# Patient Record
Sex: FEMALE | Race: WHITE | NOT HISPANIC OR LATINO | URBAN - METROPOLITAN AREA
[De-identification: names, ages, dates, MRNs, and addresses within clinical notes are randomized per-mention and may not be internally consistent; named-entity substitution may affect disease eponyms.]

---

## 2022-04-08 RX ORDER — ROSUVASTATIN CALCIUM 40 MG/1
40 TABLET, COATED ORAL NIGHTLY
COMMUNITY
Start: 2022-01-25

## 2022-04-08 RX ORDER — TRAMADOL HYDROCHLORIDE 50 MG/1
50 TABLET ORAL EVERY 4 HOURS PRN
COMMUNITY
Start: 2022-02-01

## 2022-04-08 RX ORDER — LEVOTHYROXINE SODIUM 150 UG/1
150 TABLET ORAL DAILY
COMMUNITY
Start: 2022-01-14

## 2022-04-08 RX ORDER — PANTOPRAZOLE SODIUM 40 MG/1
40 TABLET, DELAYED RELEASE ORAL DAILY
COMMUNITY
Start: 2021-12-30

## 2022-04-08 RX ORDER — RANOLAZINE 500 MG/1
500 TABLET, EXTENDED RELEASE ORAL 2 TIMES DAILY
COMMUNITY
Start: 2021-12-16

## 2022-04-08 RX ORDER — DIAZEPAM 10 MG/1
TABLET ORAL
COMMUNITY
Start: 2022-01-24

## 2022-04-08 RX ORDER — TIZANIDINE 4 MG/1
4 TABLET ORAL 3 TIMES DAILY
COMMUNITY
Start: 2022-02-01

## 2022-04-08 RX ORDER — SITAGLIPTIN AND METFORMIN HYDROCHLORIDE 1000; 50 MG/1; MG/1
1 TABLET, FILM COATED ORAL 2 TIMES DAILY
COMMUNITY
Start: 2022-01-24 | End: 2022-04-22

## 2022-04-08 RX ORDER — FUROSEMIDE 40 MG/1
40 TABLET ORAL DAILY
COMMUNITY
Start: 2022-02-08

## 2022-04-08 RX ORDER — DULAGLUTIDE 0.75 MG/.5ML
INJECTION, SOLUTION SUBCUTANEOUS
COMMUNITY
Start: 2022-02-01

## 2022-04-08 RX ORDER — ZOLPIDEM TARTRATE 10 MG/1
10 TABLET ORAL NIGHTLY
COMMUNITY
Start: 2022-01-25

## 2022-04-08 RX ORDER — LOSARTAN POTASSIUM 100 MG/1
100 TABLET ORAL DAILY
COMMUNITY
Start: 2021-10-18

## 2022-04-08 RX ORDER — CLOPIDOGREL BISULFATE 75 MG/1
75 TABLET ORAL DAILY
COMMUNITY
Start: 2021-12-21

## 2022-04-08 RX ORDER — AMITRIPTYLINE HYDROCHLORIDE 100 MG/1
100 TABLET ORAL NIGHTLY
COMMUNITY
Start: 2022-01-14

## 2022-04-08 RX ORDER — METOPROLOL TARTRATE 25 MG/1
25 TABLET, FILM COATED ORAL 2 TIMES DAILY
COMMUNITY
Start: 2021-10-27

## 2022-04-08 RX ORDER — TOPIRAMATE 100 MG/1
100 TABLET, FILM COATED ORAL NIGHTLY
COMMUNITY
Start: 2022-01-24

## 2022-04-11 ENCOUNTER — OFFICE VISIT (OUTPATIENT)
Dept: GASTROENTEROLOGY | Facility: CLINIC | Age: 52
End: 2022-04-11
Payer: COMMERCIAL

## 2022-04-11 VITALS
OXYGEN SATURATION: 90 % | SYSTOLIC BLOOD PRESSURE: 132 MMHG | HEIGHT: 67 IN | DIASTOLIC BLOOD PRESSURE: 78 MMHG | HEART RATE: 72 BPM | WEIGHT: 242 LBS | BODY MASS INDEX: 37.98 KG/M2

## 2022-04-11 DIAGNOSIS — R10.31 RIGHT LOWER QUADRANT ABDOMINAL PAIN: ICD-10-CM

## 2022-04-11 DIAGNOSIS — Z12.11 SCREENING FOR COLON CANCER: ICD-10-CM

## 2022-04-11 DIAGNOSIS — R13.19 ESOPHAGEAL DYSPHAGIA: ICD-10-CM

## 2022-04-11 DIAGNOSIS — R11.2 NAUSEA AND VOMITING, INTRACTABILITY OF VOMITING NOT SPECIFIED, UNSPECIFIED VOMITING TYPE: ICD-10-CM

## 2022-04-11 DIAGNOSIS — K21.9 GASTROESOPHAGEAL REFLUX DISEASE, UNSPECIFIED WHETHER ESOPHAGITIS PRESENT: Primary | ICD-10-CM

## 2022-04-11 PROCEDURE — 3078F DIAST BP <80 MM HG: CPT | Mod: CPTII,S$GLB,, | Performed by: INTERNAL MEDICINE

## 2022-04-11 PROCEDURE — 3008F BODY MASS INDEX DOCD: CPT | Mod: CPTII,S$GLB,, | Performed by: INTERNAL MEDICINE

## 2022-04-11 PROCEDURE — 3078F PR MOST RECENT DIASTOLIC BLOOD PRESSURE < 80 MM HG: ICD-10-PCS | Mod: CPTII,S$GLB,, | Performed by: INTERNAL MEDICINE

## 2022-04-11 PROCEDURE — 3075F SYST BP GE 130 - 139MM HG: CPT | Mod: CPTII,S$GLB,, | Performed by: INTERNAL MEDICINE

## 2022-04-11 PROCEDURE — 99203 PR OFFICE/OUTPT VISIT, NEW, LEVL III, 30-44 MIN: ICD-10-PCS | Mod: S$GLB,,, | Performed by: INTERNAL MEDICINE

## 2022-04-11 PROCEDURE — 1160F RVW MEDS BY RX/DR IN RCRD: CPT | Mod: CPTII,S$GLB,, | Performed by: INTERNAL MEDICINE

## 2022-04-11 PROCEDURE — 3075F PR MOST RECENT SYSTOLIC BLOOD PRESS GE 130-139MM HG: ICD-10-PCS | Mod: CPTII,S$GLB,, | Performed by: INTERNAL MEDICINE

## 2022-04-11 PROCEDURE — 1160F PR REVIEW ALL MEDS BY PRESCRIBER/CLIN PHARMACIST DOCUMENTED: ICD-10-PCS | Mod: CPTII,S$GLB,, | Performed by: INTERNAL MEDICINE

## 2022-04-11 PROCEDURE — 1159F PR MEDICATION LIST DOCUMENTED IN MEDICAL RECORD: ICD-10-PCS | Mod: CPTII,S$GLB,, | Performed by: INTERNAL MEDICINE

## 2022-04-11 PROCEDURE — 3008F PR BODY MASS INDEX (BMI) DOCUMENTED: ICD-10-PCS | Mod: CPTII,S$GLB,, | Performed by: INTERNAL MEDICINE

## 2022-04-11 PROCEDURE — 1159F MED LIST DOCD IN RCRD: CPT | Mod: CPTII,S$GLB,, | Performed by: INTERNAL MEDICINE

## 2022-04-11 PROCEDURE — 99203 OFFICE O/P NEW LOW 30 MIN: CPT | Mod: S$GLB,,, | Performed by: INTERNAL MEDICINE

## 2022-04-11 RX ORDER — SOD SULF/POT CHLORIDE/MAG SULF 1.479 G
12 TABLET ORAL DAILY
Qty: 24 TABLET | Refills: 0 | Status: SHIPPED | OUTPATIENT
Start: 2022-04-11

## 2022-04-11 RX ORDER — DULOXETIN HYDROCHLORIDE 60 MG/1
60 CAPSULE, DELAYED RELEASE ORAL DAILY
COMMUNITY
Start: 2022-03-03

## 2022-04-11 RX ORDER — DICYCLOMINE HYDROCHLORIDE 10 MG/1
10 CAPSULE ORAL
COMMUNITY

## 2022-04-11 RX ORDER — ASPIRIN 81 MG/1
81 TABLET ORAL DAILY
COMMUNITY

## 2022-04-11 NOTE — PROGRESS NOTES
Clinic Note    Reason for visit:  The primary encounter diagnosis was Gastroesophageal reflux disease, unspecified whether esophagitis present. Diagnoses of Esophageal dysphagia, Right lower quadrant abdominal pain, Nausea and vomiting, intractability of vomiting not specified, unspecified vomiting type, Screening for colon cancer, and BMI 37.0-37.9, adult were also pertinent to this visit.    PCP: Teja Pillai   [address]  / [place] [address]    HPI:  This is a 51 y.o. female who reports episodes of indigestion with episodes of early satiety and reflux.  She also reports episodes of midchest dysphagia to solids and pills.  She his due for colon screening.  She also reports intermittent sharp RLQ pain.          Review of Systems   Constitutional: Negative for chills, diaphoresis, fatigue, fever and unexpected weight change.   HENT: Negative for mouth sores, nosebleeds, postnasal drip, sore throat, trouble swallowing and voice change.    Eyes: Positive for eye dryness. Negative for pain and discharge.   Respiratory: Negative for apnea, cough, choking, chest tightness, shortness of breath and wheezing.    Cardiovascular: Negative for chest pain, palpitations, leg swelling and claudication.   Gastrointestinal: Positive for abdominal distention, abdominal pain, constipation and diarrhea. Negative for anal bleeding, blood in stool, change in bowel habit, nausea, rectal pain, vomiting, reflux, fecal incontinence and change in bowel habit.   Genitourinary: Negative for bladder incontinence, difficulty urinating, dysuria, flank pain, frequency and hematuria.   Musculoskeletal: Positive for back pain. Negative for arthralgias, joint swelling and joint deformity.   Integumentary:  Negative for color change, rash and wound.   Allergic/Immunologic: Positive for environmental allergies. Negative for food allergies.   Neurological: Positive for headaches and memory loss. Negative for seizures, facial asymmetry,  speech difficulty and weakness.   Hematological: Negative for adenopathy. Does not bruise/bleed easily.   Psychiatric/Behavioral: Negative for agitation, behavioral problems, confusion, hallucinations and sleep disturbance.      Past Medical History:   Diagnosis Date    Anxiety disorder, unspecified     Arthritis     CAD (coronary artery disease)     Depression     Essential (primary) hypertension     Fatty liver     Fibromyalgia     IBS (irritable bowel syndrome)     SABINE (obstructive sleep apnea)     Raynaud disease     Restless leg syndrome     Solitary kidney, congenital     Type 2 diabetes mellitus without complications      Past Surgical History:   Procedure Laterality Date    CAROTID STENT  08/2020    CHOLECYSTECTOMY      COLONOSCOPY  ?2010    HYSTERECTOMY      UPPER GASTROINTESTINAL ENDOSCOPY  ?2010     Family History   Problem Relation Age of Onset    Lymphoma Father     Esophageal cancer Paternal Aunt     Colon cancer Neg Hx     Colon polyps Neg Hx     Ulcerative colitis Neg Hx      Social History     Tobacco Use    Smoking status: Current Every Day Smoker     Packs/day: 1.00     Types: Cigarettes    Smokeless tobacco: Current User   Substance Use Topics    Alcohol use: Yes     Comment: social     Drug use: Never     Review of patient's allergies indicates:   Allergen Reactions    Penicillins     Sulfa (sulfonamide antibiotics)       Medication List with Changes/Refills   New Medications    SOD SULF-POT CHLORIDE-MAG SULF (SUTAB) 1.479-0.188- 0.225 GRAM TABLET    Take 12 tablets by mouth once daily. Take according to package instructions with indicated amount of water.   Current Medications    AMITRIPTYLINE (ELAVIL) 100 MG TABLET    Take 100 mg by mouth every evening.    ASPIRIN (ECOTRIN) 81 MG EC TABLET    Take 81 mg by mouth once daily.    CLOPIDOGREL (PLAVIX) 75 MG TABLET    Take 75 mg by mouth once daily.    DIAZEPAM (VALIUM) 10 MG TAB    TAKE 1/2 TABLET BY MOUTH TWICE DAILY  "IF NEEDED    DICYCLOMINE (BENTYL) 10 MG CAPSULE    Take 10 mg by mouth 4 (four) times daily before meals and nightly.    DULOXETINE (CYMBALTA) 60 MG CAPSULE    Take 60 mg by mouth once daily.    ERENUMAB-AOOE (AIMOVIG AUTOINJECTOR SUBQ)    Inject into the skin.    FUROSEMIDE (LASIX) 40 MG TABLET    Take 40 mg by mouth once daily.    JANUMET 50-1,000 MG PER TABLET    Take 1 tablet by mouth 2 (two) times daily.    LEVOTHYROXINE (SYNTHROID) 150 MCG TABLET    Take 150 mcg by mouth once daily.    LOSARTAN (COZAAR) 100 MG TABLET    Take 100 mg by mouth once daily.    METOPROLOL TARTRATE (LOPRESSOR) 25 MG TABLET    Take 25 mg by mouth 2 (two) times daily.    PANTOPRAZOLE (PROTONIX) 40 MG TABLET    Take 40 mg by mouth once daily.    RANOLAZINE (RANEXA) 500 MG TB12    Take 500 mg by mouth 2 (two) times daily.    ROSUVASTATIN (CRESTOR) 40 MG TAB    Take 40 mg by mouth every evening.    TIZANIDINE (ZANAFLEX) 4 MG TABLET    Take 4 mg by mouth 3 (three) times daily.    TOPIRAMATE (TOPAMAX) 100 MG TABLET    Take 100 mg by mouth every evening.    TRAMADOL (ULTRAM) 50 MG TABLET    Take 50 mg by mouth every 4 (four) hours as needed.    TRULICITY 0.75 MG/0.5 ML PEN INJECTOR    INJECT contents of one syringe SUBCUTANEOUSLY ONCE WEEKLY AS DIRECTED    ZOLPIDEM (AMBIEN) 10 MG TAB    Take 10 mg by mouth every evening.         Vital Signs:  /78 (BP Location: Right arm, Patient Position: Sitting, BP Method: Medium (Automatic))   Pulse 72   Ht 5' 7" (1.702 m)   Wt 109.8 kg (242 lb)   SpO2 (!) 90%   BMI 37.90 kg/m²   Body mass index is 37.9 kg/m².      Physical Exam  Vitals reviewed.   Constitutional:       General: She is awake. She is not in acute distress.     Appearance: Normal appearance. She is well-developed. She is not ill-appearing, toxic-appearing or diaphoretic.   HENT:      Head: Normocephalic and atraumatic.      Nose: Nose normal.      Mouth/Throat:      Mouth: Mucous membranes are moist.      Pharynx: Oropharynx is " clear. No oropharyngeal exudate or posterior oropharyngeal erythema.   Eyes:      General: Lids are normal. Gaze aligned appropriately. No scleral icterus.        Right eye: No discharge.         Left eye: No discharge.      Extraocular Movements: Extraocular movements intact.      Conjunctiva/sclera: Conjunctivae normal.   Neck:      Trachea: Trachea normal.   Cardiovascular:      Rate and Rhythm: Normal rate and regular rhythm.      Pulses:           Radial pulses are 2+ on the right side and 2+ on the left side.   Pulmonary:      Effort: Pulmonary effort is normal. No respiratory distress.      Breath sounds: Normal breath sounds. No stridor. No wheezing or rhonchi.   Chest:      Chest wall: No tenderness.   Abdominal:      General: Abdomen is flat. Bowel sounds are normal. There is no distension.      Palpations: Abdomen is soft. There is no fluid wave, hepatomegaly or mass.      Tenderness: There is abdominal tenderness in the right lower quadrant. There is no guarding or rebound.      Comments: obese   Musculoskeletal:         General: No tenderness or deformity.      Cervical back: Full passive range of motion without pain and neck supple. No tenderness.      Right lower leg: No edema.      Left lower leg: No edema.   Lymphadenopathy:      Cervical: No cervical adenopathy.   Skin:     General: Skin is warm and dry.      Capillary Refill: Capillary refill takes less than 2 seconds.      Coloration: Skin is not cyanotic, jaundiced or pale.      Findings: No rash.   Neurological:      General: No focal deficit present.      Mental Status: She is alert and oriented to person, place, and time.      Cranial Nerves: No facial asymmetry.      Motor: No tremor.   Psychiatric:         Attention and Perception: Attention normal.         Mood and Affect: Mood and affect normal.         Speech: Speech normal.         Behavior: Behavior normal. Behavior is cooperative.            All of the data above and below has been  reviewed by myself and any further interpretations will be reflected in the assessment and plan.   The data includes review of external notes, and independent interpretation of lab results, procedures, x-rays, and imaging reports.      Assessment:  Gastroesophageal reflux disease, unspecified whether esophagitis present  -     Ambulatory Referral to External Surgery    Esophageal dysphagia  -     Ambulatory Referral to External Surgery    Right lower quadrant abdominal pain    Nausea and vomiting, intractability of vomiting not specified, unspecified vomiting type    Screening for colon cancer  -     Ambulatory Referral to External Surgery    BMI 37.0-37.9, adult    Other orders  -     sod sulf-pot chloride-mag sulf (SUTAB) 1.479-0.188- 0.225 gram tablet; Take 12 tablets by mouth once daily. Take according to package instructions with indicated amount of water.  Dispense: 24 tablet; Refill: 0         Recommendations:     EGD/COLON at [facility]    Patient screened for and received weight management and nutritional counseling regarding BMI of less than 18 or greater than 25.     Risks, benefits, and alternatives of medical management, any associated procedures, and/or treatment discussed with the patient. Patient given opportunity to ask questions and voices understanding. Patient has elected to proceed with the recommended care modalities as discussed.    No follow-ups on file.    Order summary:  Orders Placed This Encounter   Procedures    Ambulatory Referral to External Surgery          Gustabo Jerry MD    This document was created using a voice recognition transcribing system. Incorrect words or phrases may have been missed during proofreading. Please interpret accordingly or contact me for clarification.

## 2022-04-13 ENCOUNTER — TELEPHONE (OUTPATIENT)
Dept: GASTROENTEROLOGY | Facility: CLINIC | Age: 52
End: 2022-04-13
Payer: COMMERCIAL

## 2022-04-13 NOTE — TELEPHONE ENCOUNTER
----- Message from Malissa Suero sent at 4/13/2022 10:40 AM CDT -----  Pt has procedure tomorrow, states she hasn't heard anything regarding when to come in and when to start taking prep. Call back is [phone]

## 2022-04-13 NOTE — TELEPHONE ENCOUNTER
She also inquired about when she needed to take her prep. I told her it all depends on when she arrives at [facility]. I went over both options with her. She understood. renae

## 2022-04-14 ENCOUNTER — OUTSIDE PLACE OF SERVICE (OUTPATIENT)
Dept: GASTROENTEROLOGY | Facility: CLINIC | Age: 52
End: 2022-04-14
Payer: COMMERCIAL

## 2022-04-14 ENCOUNTER — OUTSIDE PLACE OF SERVICE (OUTPATIENT)
Dept: GASTROENTEROLOGY | Facility: CLINIC | Age: 52
End: 2022-04-14

## 2022-04-14 LAB
ANION GAP SERPL CALC-SCNC: 7 MMOL/L (ref 3–11)
BUN SERPL-MCNC: 5 MG/DL (ref 7–18)
BUN/CREAT SERPL: 4.9 RATIO
CALCIUM SERPL-MCNC: 9.4 MG/DL (ref 8.5–10.1)
CHLORIDE SERPL-SCNC: 107 MMOL/L (ref 98–107)
CO2 SERPL-SCNC: 27 MMOL/L (ref 21–32)
CREAT SERPL-MCNC: 1.02 MG/DL (ref 0.55–1.02)
GFR ESTIMATION: 57
GLUCOSE SERPL-MCNC: 108 MG/DL (ref 74–106)
GLUCOSE SERPL-MCNC: 92 MG/DL (ref 70–105)
POTASSIUM SERPL-SCNC: 3.7 MMOL/L (ref 3.5–5.1)
SODIUM BLD-SCNC: 141 MMOL/L (ref 131–143)

## 2022-04-14 PROCEDURE — 43239 PR EGD, FLEX, W/BIOPSY, SGL/MULTI: ICD-10-PCS | Mod: 59,,, | Performed by: INTERNAL MEDICINE

## 2022-04-14 PROCEDURE — 43248 PR EGD, FLEX, W/DILATION OVER GUIDEWIRE: ICD-10-PCS | Mod: ,,, | Performed by: INTERNAL MEDICINE

## 2022-04-14 PROCEDURE — 43248 EGD GUIDE WIRE INSERTION: CPT | Mod: ,,, | Performed by: INTERNAL MEDICINE

## 2022-04-14 PROCEDURE — 45385 PR COLONOSCOPY,REMV LESN,SNARE: ICD-10-PCS | Mod: PT,,, | Performed by: INTERNAL MEDICINE

## 2022-04-14 PROCEDURE — 45385 COLONOSCOPY W/LESION REMOVAL: CPT | Mod: PT,,, | Performed by: INTERNAL MEDICINE

## 2022-04-14 PROCEDURE — 43239 EGD BIOPSY SINGLE/MULTIPLE: CPT | Mod: 59,,, | Performed by: INTERNAL MEDICINE

## 2022-04-18 LAB — SPECIMEN TO PATHOLOGY: NORMAL

## 2022-04-21 ENCOUNTER — PATIENT MESSAGE (OUTPATIENT)
Dept: GASTROENTEROLOGY | Facility: CLINIC | Age: 52
End: 2022-04-21
Payer: COMMERCIAL

## 2022-04-21 ENCOUNTER — TELEPHONE (OUTPATIENT)
Dept: GASTROENTEROLOGY | Facility: CLINIC | Age: 52
End: 2022-04-21
Payer: COMMERCIAL

## 2022-04-21 DIAGNOSIS — K31.9 GASTRIC LESION: Primary | ICD-10-CM

## 2022-04-21 DIAGNOSIS — R11.2 NAUSEA AND VOMITING, INTRACTABILITY OF VOMITING NOT SPECIFIED, UNSPECIFIED VOMITING TYPE: ICD-10-CM

## 2022-04-21 DIAGNOSIS — R10.31 RIGHT LOWER QUADRANT ABDOMINAL PAIN: ICD-10-CM

## 2022-04-21 NOTE — TELEPHONE ENCOUNTER
Messaged pt to let her know that someone from [facility] Scheduling will be contacting her to schedule her scan.

## 2022-04-21 NOTE — TELEPHONE ENCOUNTER
----- Message from Ilana Henriquez sent at 4/21/2022  8:40 AM CDT -----  Pt would like a call back in regards to who will be setting up her MRI and CT Scan, please call .[phone]

## 2022-04-22 NOTE — TELEPHONE ENCOUNTER
----- Message from Ilana Henriquez sent at 4/22/2022 10:46 AM CDT -----  Pt is requesting a call back again regarding orders and apt for a CT Scan, please call back .[phone]

## 2022-04-22 NOTE — TELEPHONE ENCOUNTER
Creatinine was 1.020 which is upper limit of normal. According to patient's PMH she has solitary kidney? Need to confirm with patient. Will need to get nephrology clearance due to only having one kidney and Creatinine in upper range of normal. Reviewed with Dr. Carpio and he suggested nephrology clearance. Please notify patient and confirm medical history.

## 2022-04-22 NOTE — TELEPHONE ENCOUNTER
Spoke to pt and let her know someone from [facility] scheduling will call her to get her scheduled. Would you mind making the order?

## 2022-04-25 ENCOUNTER — PATIENT MESSAGE (OUTPATIENT)
Dept: GASTROENTEROLOGY | Facility: CLINIC | Age: 52
End: 2022-04-25
Payer: COMMERCIAL

## 2022-04-25 NOTE — TELEPHONE ENCOUNTER
Do we know who her kidney doctor is? Dr. Carpio wanted to get clearance from nephrologist to give her the contrast.

## 2022-05-13 ENCOUNTER — TELEPHONE (OUTPATIENT)
Dept: GASTROENTEROLOGY | Facility: CLINIC | Age: 52
End: 2022-05-13
Payer: COMMERCIAL

## 2022-05-13 DIAGNOSIS — I86.4 GASTRIC VARICES: ICD-10-CM

## 2022-05-13 DIAGNOSIS — K31.9 GASTRIC LESION: Primary | ICD-10-CM

## 2022-05-13 DIAGNOSIS — R11.2 NAUSEA AND VOMITING, INTRACTABILITY OF VOMITING NOT SPECIFIED, UNSPECIFIED VOMITING TYPE: ICD-10-CM

## 2022-05-13 NOTE — TELEPHONE ENCOUNTER
I dont know if they every came to you because I dont see result notes. Would you mind sending me the patients path results and review the labs for me also.

## 2022-05-13 NOTE — TELEPHONE ENCOUNTER
Letter created to PCP to receive clearance for IV contrast. Patient states PCP is the one monitoring her kidney function and she does not have a nephrologist at this time.   MLC

## 2022-05-13 NOTE — LETTER
May 13, 2022        Teja Pillai MD  [address] [facility] [address]             [facility] [place]  [address] [place] [address]  Phone: [phone]  Fax: [phone]   Patient: Desire Novak   MR Number: [id number]   YOB: 1970   Date of Visit: 5/13/2022     Dear Dr. Pillai,     The above patient needs a CT scan of her abdomen with contrast. Her creatinine was 1.020 on recent lab work so I recommended nephrology clearance for IV contrast due to patient having only one kidney. The patient reported that you are the one monitoring her kidney function. Please let me know if the above patient has clearance to receive IV contrast. Our fax number is [phone]. If you have any questions, please do not hesitate to call me at [phone].     Sincerely,      Gsutabo Carpio MD            CC  No Recipients    Enclosure

## 2022-05-18 NOTE — TELEPHONE ENCOUNTER
Spoke to the . She said they did receive the clearance letter. Dr. Rosas warner noted anything about it yet. She said she would have him or the nurse call us back with an answer.

## 2022-05-20 NOTE — TELEPHONE ENCOUNTER
We received clearance for CT scan and it is scanned into Media. Melissa, fax CT scan order to [facility] scheduling. Reviewed with Dr. Carpio who agrees with plan.   MLC

## 2022-06-07 ENCOUNTER — TELEPHONE (OUTPATIENT)
Dept: GASTROENTEROLOGY | Facility: CLINIC | Age: 52
End: 2022-06-07
Payer: COMMERCIAL

## 2022-06-07 NOTE — TELEPHONE ENCOUNTER
----- Message from Whit Travis sent at 6/7/2022  2:33 PM CDT -----  [facility] call back regarding outpatient procedure on 6/9..[phone]

## 2022-06-09 ENCOUNTER — TELEPHONE (OUTPATIENT)
Dept: GASTROENTEROLOGY | Facility: CLINIC | Age: 52
End: 2022-06-09
Payer: COMMERCIAL

## 2022-06-09 NOTE — TELEPHONE ENCOUNTER
----- Message from Whit Travis sent at 6/9/2022 11:39 AM CDT -----  susie santillan/ [facility] needs call back regarding prior authorization...[phone]